# Patient Record
Sex: MALE | Race: WHITE
[De-identification: names, ages, dates, MRNs, and addresses within clinical notes are randomized per-mention and may not be internally consistent; named-entity substitution may affect disease eponyms.]

---

## 2020-05-05 ENCOUNTER — HOSPITAL ENCOUNTER (INPATIENT)
Dept: HOSPITAL 92 - ERS | Age: 50
LOS: 2 days | Discharge: HOME | DRG: 392 | End: 2020-05-07
Attending: INTERNAL MEDICINE | Admitting: INTERNAL MEDICINE
Payer: COMMERCIAL

## 2020-05-05 VITALS — BODY MASS INDEX: 32.5 KG/M2

## 2020-05-05 DIAGNOSIS — Z79.899: ICD-10-CM

## 2020-05-05 DIAGNOSIS — E78.5: ICD-10-CM

## 2020-05-05 DIAGNOSIS — Z88.0: ICD-10-CM

## 2020-05-05 DIAGNOSIS — K80.20: ICD-10-CM

## 2020-05-05 DIAGNOSIS — K52.9: Primary | ICD-10-CM

## 2020-05-05 DIAGNOSIS — K56.7: ICD-10-CM

## 2020-05-05 LAB
ALBUMIN SERPL BCG-MCNC: 4.6 G/DL (ref 3.5–5)
ALP SERPL-CCNC: 77 U/L (ref 40–110)
ALT SERPL W P-5'-P-CCNC: 24 U/L (ref 8–55)
ANION GAP SERPL CALC-SCNC: 13 MMOL/L (ref 10–20)
AST SERPL-CCNC: 14 U/L (ref 5–34)
BASOPHILS # BLD AUTO: 0.1 THOU/UL (ref 0–0.2)
BASOPHILS NFR BLD AUTO: 1 % (ref 0–1)
BILIRUB SERPL-MCNC: 0.4 MG/DL (ref 0.2–1.2)
BUN SERPL-MCNC: 18 MG/DL (ref 8.9–20.6)
CALCIUM SERPL-MCNC: 9.8 MG/DL (ref 7.8–10.44)
CHLORIDE SERPL-SCNC: 102 MMOL/L (ref 98–107)
CO2 SERPL-SCNC: 27 MMOL/L (ref 22–29)
CREAT CL PREDICTED SERPL C-G-VRATE: 0 ML/MIN (ref 70–130)
EOSINOPHIL # BLD AUTO: 0.1 THOU/UL (ref 0–0.7)
EOSINOPHIL NFR BLD AUTO: 1 % (ref 0–10)
GLOBULIN SER CALC-MCNC: 3.1 G/DL (ref 2.4–3.5)
GLUCOSE SERPL-MCNC: 79 MG/DL (ref 70–105)
HGB BLD-MCNC: 15.7 G/DL (ref 14–18)
LYMPHOCYTES # BLD: 1.6 THOU/UL (ref 1.2–3.4)
LYMPHOCYTES NFR BLD AUTO: 17 % (ref 21–51)
MCH RBC QN AUTO: 30.9 PG (ref 27–31)
MCV RBC AUTO: 93.1 FL (ref 78–98)
MONOCYTES # BLD AUTO: 0.7 THOU/UL (ref 0.11–0.59)
MONOCYTES NFR BLD AUTO: 7 % (ref 0–10)
NEUTROPHILS # BLD AUTO: 7 THOU/UL (ref 1.4–6.5)
NEUTROPHILS NFR BLD AUTO: 74 % (ref 42–75)
PLATELET # BLD AUTO: 332 THOU/UL (ref 130–400)
POTASSIUM SERPL-SCNC: 3.9 MMOL/L (ref 3.5–5.1)
RBC # BLD AUTO: 5.09 MILL/UL (ref 4.7–6.1)
SODIUM SERPL-SCNC: 138 MMOL/L (ref 136–145)
WBC # BLD AUTO: 9.5 THOU/UL (ref 4.8–10.8)

## 2020-05-05 PROCEDURE — 85025 COMPLETE CBC W/AUTO DIFF WBC: CPT

## 2020-05-05 PROCEDURE — 83605 ASSAY OF LACTIC ACID: CPT

## 2020-05-05 PROCEDURE — 71045 X-RAY EXAM CHEST 1 VIEW: CPT

## 2020-05-05 PROCEDURE — 80053 COMPREHEN METABOLIC PANEL: CPT

## 2020-05-05 PROCEDURE — 93005 ELECTROCARDIOGRAM TRACING: CPT

## 2020-05-05 PROCEDURE — C9113 INJ PANTOPRAZOLE SODIUM, VIA: HCPCS

## 2020-05-05 PROCEDURE — 36415 COLL VENOUS BLD VENIPUNCTURE: CPT

## 2020-05-05 PROCEDURE — 80048 BASIC METABOLIC PNL TOTAL CA: CPT

## 2020-05-05 PROCEDURE — 81003 URINALYSIS AUTO W/O SCOPE: CPT

## 2020-05-05 PROCEDURE — 87449 NOS EACH ORGANISM AG IA: CPT

## 2020-05-05 PROCEDURE — 74177 CT ABD & PELVIS W/CONTRAST: CPT

## 2020-05-05 PROCEDURE — 83690 ASSAY OF LIPASE: CPT

## 2020-05-05 PROCEDURE — 87324 CLOSTRIDIUM AG IA: CPT

## 2020-05-05 NOTE — PDOC.HHP
Hospitalist HPI





- History of Present Illness


Abdominal pain


History of Present Illness: 





PCP: Caitlin





The patient is a 50/M with no significant PMH that presents to the ER for above 

complaint. The patient reports having abdominal pain x 4 days, describes as 

diffuse cramping with associated intermittent diarrhea and decreased appetite. 

He went to his PCP this morning. KUB showed concern for SBO. She instructed him 

to go to the ER. Denies any emesis or blood in stools. Denies any previous 

abdominal surgeries, recent travel or ingestion of uncooked foods. Reports 

recent bronchitis infection, treated with 2 different antibiotics. Denies any 

fever or chills. Has no other complaints at this time.


ED Course: 





VS 98.2F, 121/84, 91, 16, 97%RA


EKG NSR


CT abd/pelvis mild dilation proximal small bowel loops extending to ileum, 

ileus vs low grade SBO


WBC 9.5


LA 0.9


UA and CMP unremarkable


CXR no acute process














Allergies: NKDA





Home Medications:


1. Simvastatin unk. dose





Hospitalist ROS





- Review of Systems


Constitutional: denies: fever, chills, sweats, weakness, malaise, other


Eyes: denies: pain, vision change, conjunctivae inflammation, eyelid 

inflammation, redness, other


ENT: denies: ear pain, ear discharge, nose pain, nose discharge, nose congestion

, mouth pain, mouth swelling, throat pain, throat swelling, other


Respiratory: denies: cough, dry, shortness of breath, hemoptysis, SOB with 

excertion, pleuritic pain, sputum, wheezing, other


Cardiovascular: denies: chest pain, palpitations, orthopnea, paroxysmal noc. 

dyspnea, edema, light headedness, other


Gastrointestinal: reports: abdominal pain, diarrhea (intermittent).  denies: 

nausea, vomiting


Genitourinary: denies: dysuria, frequency, incontinence, hematuria, retention, 

other


Neurological: denies: weakness, numbness, incoordination, change in speech, 

confusion, seizures, other





Hospitalist History





- Past Medical History


Source: patient


Cardiac: reports: Hyperlipidemia





- Past Surgical History


Past Surgical History: reports: no pertinent history





- Family History


Family History: reports: no pertinent history


Other Family History: 





non contributory to this case





- Social History


Smoking Status: Never smoker


Alcohol: reports: Rare


Drugs: reports: none


Living Situation: With Family


Occupation: Lives in [place] with spouse, he is a 


Activity level: independent ambulation





- Exam


General Appearance: NAD, awake alert


Eye: anicteric sclera


ENT: normocephalic atraumatic


ENT - other findings: NGT in place to low intermittent suction


Neck: supple, no JVD


Heart: RRR, no murmur, no gallops, no rubs, normal peripheral pulses


Respiratory: CTAB, no wheezes, no rales, no ronchi, no tachypnea


Gastrointestinal: soft, no guarding, no rigidity, distended, diminished bowl 

sounds


Gastrointestinal - other findings: mildly tender to palpation, diffuse


Extremities: no cyanosis, no edema


Neurological: no focal deficits


Psychiatric: normal affect, A&O x 3





Hospitalist Results





- Labs


Result Diagrams: 


 05/05/20 19:27 05/05/20 19:27


Lab results: 


 











WBC  9.5 thou/uL (4.8-10.8)   05/05/20  19:27    


 


Hgb  15.7 g/dL (14.0-18.0)   05/05/20  19:27    


 


Hct  47.4 % (42.0-52.0)   05/05/20  19:27    


 


MCV  93.1 fL (78.0-98.0)   05/05/20  19:27    


 


Plt Count  332 thou/uL (130-400)   05/05/20  19:27    


 


Neutrophils %  74.0 % (42.0-75.0)   05/05/20  19:27    


 


Sodium  138 mmol/L (136-145)   05/05/20  19:27    


 


Potassium  3.9 mmol/L (3.5-5.1)   05/05/20  19:27    


 


Chloride  102 mmol/L ()   05/05/20  19:27    


 


Carbon Dioxide  27 mmol/L (22-29)   05/05/20  19:27    


 


BUN  18 mg/dL (8.9-20.6)   05/05/20  19:27    


 


Creatinine  1.00 mg/dL (0.7-1.3)   05/05/20  19:27    


 


Glucose  79 mg/dL ()   05/05/20  19:27    


 


Lactic Acid  0.9 mmol/L (0.5-2.2)   05/05/20  19:27    


 


Calcium  9.8 mg/dL (7.8-10.44)   05/05/20  19:27    


 


Total Bilirubin  0.4 mg/dL (0.2-1.2)   05/05/20  19:27    


 


AST  14 U/L (5-34)   05/05/20  19:27    


 


ALT  24 U/L (8-55)   05/05/20  19:27    


 


Alkaline Phosphatase  77 U/L ()   05/05/20  19:27    


 


Serum Total Protein  7.7 g/dL (6.0-8.3)   05/05/20  19:27    


 


Albumin  4.6 g/dL (3.5-5.0)   05/05/20  19:27    


 


Urine Ketones  Negative mg/dL (Negative)   05/05/20  21:39    


 


Urine Blood  Negative  (Negative)   05/05/20  21:39    


 


Urine Nitrite  Negative  (Negative)   05/05/20  21:39    


 


Ur Leukocyte Esterase  Negative Ashly/uL (Negative)   05/05/20  21:39    














- EKG Interpretation


EKG: 





NSR





- Radiology Interpretation


  ** CT scan - abdomen


Status: report reviewed by me





  ** Chest x-ray


Status: report reviewed by me





Hospitalist H&P A/P





- Problem


(1) SBO (small bowel obstruction)


Code(s): K56.609 - UNSP INTESTNL OBST, UNSP AS TO PARTIAL VERSUS COMPLETE OBST 

  Status: Acute   


Assessment and Plan: 


Admit to medical floor, inpatient status


Expected length of stay > 2 midnights


CT + partial SBO vs ileus


LA 0.9, WBC 9.5


Will confirm placement NGT, put to intermittent suction


Will start IVF hydration


Will consult general surgery


Will add zofran and morphine prn


Will start PPI


Will check lipase








(2) Diarrhea


Code(s): R19.7 - DIARRHEA, UNSPECIFIED   Status: Acute   


Assessment and Plan: 


Had recent abx usage, describes as intermittent, had formed stools yesterday


Will continue to monitor, likely secondary to problem #1








(3) Hyperlipidemia


Code(s): E78.5 - HYPERLIPIDEMIA, UNSPECIFIED   Status: Chronic   


Assessment and Plan: 


Takes simvastatin unknown dosage


Will have nursing reconcile medications








- Plan


Plan: 





Consult PT


SCDs for DVT prophylaxis


Protonix for GI prophylaxis


Full Code


DIONISIO Diaz at [phone]


Discussed case with Dr. Baugh

## 2020-05-05 NOTE — CT
CT ABDOMEN AND PELVIS WITH CONTRAST:

5/5/20

 

HISTORY: 

Small bowel obstruction.

 

COMPARISON: 

Radiograph same day of the abdomen.

 

FINDINGS: 

Lung bases are clear. No pericardial effusion.

 

Normal proximal small bowel rotation. There is cholelithiasis. No hydronephrosis. The adrenal glands 
are unremarkable. 

 

There are mildly distended loops of mid small bowel with contrast reaching the ileum. No distinct tra
nsition point. The appendix is visualized and is normal. No mesenteric edema.  The aortic contour is 
normal.

 

No acute osseous abnormality. Advanced degenerative  disc space disease at L5-S1 with superior end pl
ate erosion and Schmorl's node at S1. Celiac trunk and superior mesenteric arteries are patent. 

 

IMPRESSION: 

1.      Mild dilatation of proximal small bowel loops with contrast extending to the ileum. There is 
no discrete transition point nor mesenteric fluid. Findings suggestive of an ileus versus a very low 
grade small bowel obstruction without transitional point identified. Follow-up radiographs of the abd
omen in 8 to 12 hours recommended to evaluate for contrast transit. 

2.      Cholelithiasis without cholecystitis. 

 

POS: HOME

## 2020-05-05 NOTE — RAD
CHEST ONE VIEW:

5/5/20

 

HISTORY:

Abdominal pain. 

 

COMPARISON: 

None.

 

FINDINGS: 

The lungs are clear. No pneumothorax or effusion. The cardia silhouette and mediastinal contours are 
within normal limits. No acute osseous abnormality. 

 

IMPRESSION: 

No acute intrathoracic abnormality.

 

POS: HOME

## 2020-05-06 LAB
ANION GAP SERPL CALC-SCNC: 13 MMOL/L (ref 10–20)
BASOPHILS # BLD AUTO: 0.1 THOU/UL (ref 0–0.2)
BASOPHILS NFR BLD AUTO: 0.7 % (ref 0–1)
BUN SERPL-MCNC: 14 MG/DL (ref 8.9–20.6)
CALCIUM SERPL-MCNC: 8.7 MG/DL (ref 7.8–10.44)
CHLORIDE SERPL-SCNC: 105 MMOL/L (ref 98–107)
CO2 SERPL-SCNC: 25 MMOL/L (ref 22–29)
CREAT CL PREDICTED SERPL C-G-VRATE: 149 ML/MIN (ref 70–130)
EOSINOPHIL # BLD AUTO: 0.2 THOU/UL (ref 0–0.7)
EOSINOPHIL NFR BLD AUTO: 2.2 % (ref 0–10)
GLUCOSE SERPL-MCNC: 79 MG/DL (ref 70–105)
HGB BLD-MCNC: 13.9 G/DL (ref 14–18)
LYMPHOCYTES # BLD: 1.8 THOU/UL (ref 1.2–3.4)
LYMPHOCYTES NFR BLD AUTO: 23.1 % (ref 21–51)
MCH RBC QN AUTO: 29.9 PG (ref 27–31)
MCV RBC AUTO: 93.6 FL (ref 78–98)
MONOCYTES # BLD AUTO: 0.7 THOU/UL (ref 0.11–0.59)
MONOCYTES NFR BLD AUTO: 8.7 % (ref 0–10)
NEUTROPHILS # BLD AUTO: 5 THOU/UL (ref 1.4–6.5)
NEUTROPHILS NFR BLD AUTO: 65.3 % (ref 42–75)
PLATELET # BLD AUTO: 286 THOU/UL (ref 130–400)
POTASSIUM SERPL-SCNC: 3.9 MMOL/L (ref 3.5–5.1)
RBC # BLD AUTO: 4.66 MILL/UL (ref 4.7–6.1)
SODIUM SERPL-SCNC: 139 MMOL/L (ref 136–145)
WBC # BLD AUTO: 7.6 THOU/UL (ref 4.8–10.8)

## 2020-05-06 NOTE — CON
DATE OF CONSULTATION:  05/06/2020



REQUESTING PHYSICIAN:  Forrest Jarrett NP



HISTORY OF PRESENT ILLNESS:  This is a 50-year-old  man, who presented to

emergency department yesterday complaining of a 4-day history of crampy abdominal

pain associated with abdominal bloating and diarrhea. 



The patient denied any hematochezia or melena. 



Denied any nausea or vomiting. 



He has recently been treated for bronchitis 3 weeks ago first with a course of

azithromycin for 1 week followed by one more week of a broad-spectrum antibiotic.

He does not recall the name of the antibiotic, but thinks it might be levofloxacin.

He denies any fevers or chills. 



He denies any unexplained weight loss. 



The patient was seen by his primary care physician, obtained abdominal x-ray, which

was suspicious for partial small-bowel obstruction for which the patient was

referred to the emergency department. 



Additional workup included CT scan of the abdomen and pelvis again revealed a few

distended loops of small bowel.  No clear transition zone. 



A nasogastric tube was placed, which has returned minimum nonbilious gastric

effluent since placement of the tube. 



At the time of my evaluation, the patient is awake and alert. 



He reports minimal abdominal pain only with deep palpation. 



He passed flatus 15 minutes prior to my arrival. 



Last bowel movement was yesterday and was semiformed.



PAST MEDICAL HISTORY:  Pertinent for hyperlipidemia.



PAST SURGICAL HISTORY:  He denies any previous surgeries.



SOCIAL HISTORY:  He is , lives at home with his family. 



He is employed as a . 



He admits to occasional intake of ethanol in moderate amounts. 



He denies any cigarette smoking or illicit drug abuse.



CURRENT MEDICATIONS:  Include:

1. Simvastatin 40 mg p.o. daily.

2. Florastor, which was just started in this hospitalization 250 mg p.o. daily.



ALLERGIES:  TO PENICILLIN.



REVIEW OF SYSTEMS:  10-point review of systems essentially unremarkable except as

stated in past medical history and chief complaint. 



PHYSICAL EXAMINATION:

GENERAL:  This reveals a 50-year-old normally developed man, who is otherwise

coherent, interactive, and appears stated age.  The patient is alert and oriented

x3.  He appears to be in no acute distress at time of my evaluation. 

VITAL SIGNS:  Include blood pressure 121/75, pulse is 79, respiratory rate is 16,

temperature is 98 degrees Fahrenheit, oxygen saturation is 96% on room air. 

HEART:  Reveals regular rate and rhythm. 

LUNGS:  Clear to auscultation bilaterally.  Breathing, regular and nonlabored. 

ABDOMEN:  Soft and moderately distended, but nontender to palpation.  He clearly has

no peritoneal signs on examination. 

NEUROLOGIC:  Reveals no focal deficits present.



LABORATORY FINDINGS:  Today include a CBC with 7600 white blood cells, hemoglobin

and hematocrit are 13.9 and 43.6 respectively, platelet count is 286,000. 



Metabolic profile; sodium 139, potassium 3.9, chloride is 105, bicarb is 25, BUN 14,

creatinine 0.85, glucose 79. 



I have personally reviewed the CT scan of the abdomen and pelvis obtained yesterday,

which is only pertinent for moderately distended loops of mid small bowel.  No

transition zone noted. 



No free fluid.  No pneumatosis intestinalis or pneumoperitoneum is evident. 



There is a large solitary gallstone, which is nonobstructive within the gallbladder

lumen. 



IMPRESSION:  

1. Resolving gastroenteritis versus Clostridium difficile colitis.

2. Cholelithiasis with no evidence of acute cholecystitis, although chronic

cholecystitis could not be excluded. 



PLAN:  

1. Nasogastric tube will be discontinued.  The patient is started on diet.

2. Continue with serial physical examination and make further recommendations as

necessary. 

3. There clearly is no acute surgical indication for this patient at this time.  I

do not believe that this patient has any bowel obstruction. 



Thank you again, Dr. Jarrett, for allowing me the opportunity to participate in the

care of this patient. 







Job ID:  [id number]

## 2020-05-06 NOTE — RAD
CHEST 1 VIEW:

 

Date:  05/05/2020

 

HISTORY:  

NG tube placement. 

 

COMPARISON:  

Radiograph same date. 

 

FINDINGS:

Enteric tube is in place with tip in the gastric body. 

 

IMPRESSION: 

Satisfactory location of the enteric tube. 

 

 

POS: HOME

## 2020-05-06 NOTE — PDOC.HOSPP
- Subjective


Encounter Date: 05/06/20


Encounter Time: 09:15


Subjective: 





pt does no have more diarrhea, this morning NG tube removed, started on diet





- Objective


Vital Signs & Weight: 


 Vital Signs (12 hours)











  Temp Pulse Resp BP Pulse Ox


 


 05/06/20 08:00      96


 


 05/06/20 07:29  98.0 F  79  16  121/75  96


 


 05/06/20 04:00  97.8 F  64  17  112/73 


 


 05/06/20 00:00  97.5 F L  81  18  121/81  97








 Weight











Weight                         220 lb 14.451 oz














Result Diagrams: 


 05/06/20 05:20





 05/06/20 05:20


Radiology Reviewed by me: Yes


EKG Reviewed by me: Yes





Hospitalist ROS





- Review of Systems


Eyes: denies: pain, vision change, conjunctivae inflammation, eyelid 

inflammation, redness, other


ENT: denies: ear pain, ear discharge, nose pain, nose discharge, nose congestion

, mouth pain, mouth swelling, throat pain, throat swelling, other


Respiratory: denies: cough, dry, shortness of breath, hemoptysis, SOB with 

excertion, pleuritic pain, sputum, wheezing, other


Cardiovascular: denies: chest pain, palpitations, orthopnea, paroxysmal noc. 

dyspnea, edema, light headedness, other


Gastrointestinal: denies: nausea, vomiting, abdominal pain, diarrhea, 

constipation, melena, hematochezia, other


Genitourinary: denies: dysuria, frequency, incontinence, hematuria, retention, 

other


Musculoskeletal: denies: neck pain, shoulder pain, arm pain, back pain, hand 

pain, leg pain, foot pain, other


Skin: denies: rash, lesions, rashi, bruising, other





- Medication


Medications: 


Active Medications











Generic Name Dose Route Start Last Admin





  Trade Name Freq  PRN Reason Stop Dose Admin


 


Sodium Chloride  1,000 mls @ 125 mls/hr  05/05/20 23:00  05/06/20 08:12





  Normal Saline 0.9%  IV   1,000 mls





  .Q8H ALVARO   Administration





     





     





     





     


 


Pantoprazole Sodium  40 mg  05/06/20 09:00  05/06/20 08:09





  Protonix  IVP   40 mg





  Q12HR ALVARO   Administration





     





     





     





     


 


Saccharomyces Boulardii  250 mg  05/06/20 09:00  05/06/20 10:16





  Florastor  PO   250 mg





  DAILY ALVARO   Administration





     





     





     





     














- Exam


General Appearance: NAD, awake alert


Eye: PERRL, anicteric sclera


ENT: normocephalic atraumatic, no oropharyngeal lesions


Neck: supple, symmetric, no JVD, no thyromegaly


Heart: RRR, no murmur, no gallops, no rubs


Respiratory: CTAB, no wheezes, no rales, no ronchi


Gastrointestinal: soft, non-tender, non-distended, normal bowel sounds


Extremities: no cyanosis, no clubbing, no edema


Skin: normal turgor, no lesions


Neurological: no focal deficits


Musculoskeletal: normal tone, normal strength


Psychiatric: normal affect, normal behavior





Hosp A/P


(1) Diarrhea


Code(s): R19.7 - DIARRHEA, UNSPECIFIED   Status: Acute   


Qualifiers: 


   Diarrhea type: unspecified type   Qualified Code(s): R19.7 - Diarrhea, 

unspecified   





(2) SBO (small bowel obstruction)


Code(s): K56.609 - UNSP INTESTNL OBST, UNSP AS TO PARTIAL VERSUS COMPLETE OBST 

  Status: Ruled-out   


Plan: 


SBO ruled out








(3) Hyperlipidemia


Code(s): E78.5 - HYPERLIPIDEMIA, UNSPECIFIED   Status: Chronic   





- Plan


old records reviewed/req





agree with DC NG tube


regular diet


ambulate


check stool for c-diff


will consider dc tomorrow

## 2020-05-07 VITALS — TEMPERATURE: 97.6 F | SYSTOLIC BLOOD PRESSURE: 123 MMHG | DIASTOLIC BLOOD PRESSURE: 75 MMHG

## 2020-05-07 NOTE — PDOC.HOSPP
- Subjective


Encounter Date: 05/07/20


Encounter Time: 09:15


Subjective: 





Patient seen and examined. No new complaints. No overnight events





- Objective


Vital Signs & Weight: 


 Vital Signs (12 hours)











  Temp Pulse Resp BP Pulse Ox


 


 05/07/20 08:02  98.3 F  88  19  120/80  97








 Weight











Admit Weight                   220 lb 14.451 oz


 


Weight                         220 lb 14.451 oz














I&O: 


 











 05/06/20 05/07/20 05/08/20





 06:59 06:59 06:59


 


Intake Total  3009 


 


Output Total  700 


 


Balance  2309 











Result Diagrams: 


 05/06/20 05:20 05/06/20 05:20





Hospitalist ROS





- Review of Systems


ENT: denies: ear pain, ear discharge, nose pain, nose discharge, nose congestion

, mouth pain, mouth swelling, throat pain, throat swelling, other


Respiratory: denies: cough, dry, shortness of breath, hemoptysis, SOB with 

excertion, pleuritic pain, sputum, wheezing, other


Cardiovascular: denies: chest pain, palpitations, orthopnea, paroxysmal noc. 

dyspnea, edema, light headedness, other


Gastrointestinal: denies: nausea, vomiting, abdominal pain, diarrhea, 

constipation, melena, hematochezia, other


Genitourinary: denies: dysuria, frequency, incontinence, hematuria, retention, 

other


Musculoskeletal: denies: neck pain, shoulder pain, arm pain, back pain, hand 

pain, leg pain, foot pain, other


Skin: denies: rash, lesions, rashi, bruising, other





- Medication


Medications: 


Active Medications











Generic Name Dose Route Start Last Admin





  Trade Name Freq  PRN Reason Stop Dose Admin


 


Atorvastatin Calcium  20 mg  05/06/20 21:00  05/06/20 20:27





  Lipitor  PO   20 mg





  HS ALVARO   Administration





     





     





     





     


 


Famotidine  20 mg  05/06/20 21:00  05/07/20 07:39





  Pepcid  PO   20 mg





  BID ALVARO   Administration





     





     





     





     


 


Saccharomyces Boulardii  250 mg  05/06/20 09:00  05/07/20 07:39





  Florastor  PO   250 mg





  DAILY ALVARO   Administration





     





     





     





     














- Exam


General Appearance: NAD, awake alert


Eye: PERRL, anicteric sclera


ENT: normocephalic atraumatic, no oropharyngeal lesions


Neck: supple, symmetric, no JVD


Heart: RRR, no murmur, no gallops


Respiratory: CTAB, no wheezes, no rales, no ronchi


Gastrointestinal: soft, non-tender, non-distended, normal bowel sounds


Extremities: no cyanosis, no clubbing, no edema


Skin: normal turgor, no lesions


Neurological: no focal deficits


Musculoskeletal: normal tone, normal strength


Psychiatric: normal affect, normal behavior





Hosp A/P


(1) Diarrhea


Code(s): R19.7 - DIARRHEA, UNSPECIFIED   Status: Acute   


Qualifiers: 


   Diarrhea type: unspecified type   Qualified Code(s): R19.7 - Diarrhea, 

unspecified   





(2) SBO (small bowel obstruction)


Code(s): K56.609 - UNSP INTESTNL OBST, UNSP AS TO PARTIAL VERSUS COMPLETE OBST 

  Status: Ruled-out   





(3) Hyperlipidemia


Code(s): E78.5 - HYPERLIPIDEMIA, UNSPECIFIED   Status: Chronic   





- Plan


old records reviewed/req





agree with DC NG tube


regular diet


ambulate


check stool for c-diff


will consider dc tomorrow





05/07/20





c-dff negative


Dc home

## 2020-05-07 NOTE — PRG
DATE OF SERVICE:  05/07/2020



SUBJECTIVE:  Mr. Melendez is a 50-year-old man who was admitted with abdominal pain

and suspicion for partial small bowel obstruction. 



The patient is awake and alert this morning.  He denies any abdominal pain. 



He is passing flatus and has had a bowel movement which was semiformed within last

24 hours. 



He is tolerating general diet.



OBJECTIVE:  VITAL SIGNS:  Include blood pressure 120/80, pulse 88, respiratory rate

is 19, temperature 98.3 degrees Fahrenheit, and oxygen saturation is 97% on room

air. 

ABDOMEN:  Soft and distended with gas, but nontender to palpation.  He clearly has

no peritoneal signs on examination. 



IMPRESSION:  

1. Resolved abdominal pain, likely secondary to gastroenteritis.  The patient tested

negative for Clostridium difficile colitis. 

2. Asymptomatic cholelithiasis. 

I did discuss with the patient with regard to his large solitary gallstone without

any symptoms at this time. 



PLAN:  No further surgical indication is warranted. 



The patient may be discharged home at the discretion of the primary service. 



I advised the patient to follow up with his primary care physician for appropriate

referral if he becomes symptomatic with regard to his gallstone. 



The patient indicates understanding of information given.  I have answered his

questions. 





Job ID:  [id number]

## 2020-05-07 NOTE — DIS
DATE OF ADMISSION:  05/05/2020



DATE OF DISCHARGE:  05/07/2020



PRIMARY CARE PHYSICIAN:  [facility].



DISCHARGE DISPOSITION:  Home.



PRIMARY DISCHARGE DIAGNOSES:  

1. Partial small-bowel obstruction suspected ruled out.

2. Diarrhea, likely gastroenteritis, resolved.



SECONDARY DISCHARGE DIAGNOSIS:  Dyslipidemia.



PRIMARY PROCEDURE/OPERATION:  None.



RADIOLOGICAL INVESTIGATION:  Abdomen and pelvis CT scan showed finding suggestive of

mild dilatation of proximal small bowel, cholelithiasis.  Chest x-ray was normal. 



SIGNIFICANT LABORATORY DATA:  WBC 7.6, hemoglobin 13.9, platelet 286.  Sodium 139,

creatinine 0.85.  LFT normal.  Lipase 14.  Urinalysis normal.  Stool for C diff

negative. 



DISCHARGE MEDICATION:  Simvastatin 40 mg p.o. daily.



CONTRAINDICATION:  None.



CODE STATUS:  Full code.



INPATIENT CONSULTANT:  Dr. Hany Young was consulted while in hospital.



TEST RESULTS PENDING ON DISCHARGE:  None.



ALLERGIES:  PENICILLIN.



DISCHARGE PLAN:  Posthospital, the patient will follow up with primary care

physician. 



HOSPITAL COURSE:  A 50-year-old male with above-mentioned medical problem, who was

admitted by nurse practitioner.  Please see his H and P for further details.  The

patient was having nausea, vomiting, diarrhea.  He was having crampy abdominal pain.

 He had CT of abdomen and pelvis, which suspected ileus versus low-grade small-bowel

obstruction.  The patient was having diarrhea without any abdominal distention.  He

was treated with NG tube initially.  Next, General Surgery was consulted.  The

patient's clinical presentation was not consistent with any small-bowel obstruction,

rather mild ileus was possible, that was also improved.  NG tube was discontinued,

started on diet.  We have ruled out C diff infection.  The patient was tolerating

his diet without any problem.  He remained stable while in hospital and we are

discharging him today.  The patient will continue all his previous medication. 



The patient is seen and examined at bedside today.  Please see my progress note from

today for further detail. 







Job ID:  [id number]

## 2020-05-14 NOTE — EKG
Test Reason : ABD PAIN

Blood Pressure : ***/*** mmHG

Vent. Rate : 077 BPM     Atrial Rate : 077 BPM

   P-R Int : 162 ms          QRS Dur : 110 ms

    QT Int : 390 ms       P-R-T Axes : 040 -03 018 degrees

   QTc Int : 441 ms

 

Normal sinus rhythm

Normal ECG

 

Confirmed by JAN CHRISTENSEN DO ([id number]),  DAVIS MARTELL ([id number]) on 5/14/2020 3:47:36 PM

 

Referred By:             Confirmed By:JAN CHRISTENSEN DO

## 2022-08-30 ENCOUNTER — HOSPITAL ENCOUNTER (OUTPATIENT)
Dept: HOSPITAL 92 - SLEEPLAB | Age: 52
Discharge: HOME | End: 2022-08-30
Attending: INTERNAL MEDICINE
Payer: COMMERCIAL

## 2022-08-30 DIAGNOSIS — G47.33: Primary | ICD-10-CM

## 2022-08-30 PROCEDURE — 95800 SLP STDY UNATTENDED: CPT
